# Patient Record
Sex: FEMALE | Race: ASIAN | Employment: FULL TIME | URBAN - METROPOLITAN AREA
[De-identification: names, ages, dates, MRNs, and addresses within clinical notes are randomized per-mention and may not be internally consistent; named-entity substitution may affect disease eponyms.]

---

## 2017-06-02 NOTE — PROGRESS NOTES
SUBJECTIVE:     CC: Trinity Ramírez is an 42 year old woman who presents for preventive health visit.     Healthy Habits:    Do you get at least three servings of calcium containing foods daily (dairy, green leafy vegetables, etc.)? yes    Amount of exercise or daily activities, outside of work: none    Problems taking medications regularly not applicable    Medication side effects: N/A    Have you had an eye exam in the past two years? yes    Do you see a dentist twice per year? no    Do you have sleep apnea, excessive snoring or daytime drowsiness?yes- snoring         -------------------------------------    Today's PHQ-2 Score:   PHQ-2 ( 1999 Pf[employer]) 6/6/2017 7/16/2014   Q1: Little interest or pleasure in doing things 0 0   Q2: Feeling down, depressed or hopeless 0 0   PHQ-2 Score 0 0       Abuse: Current or Past(Physical, Sexual or Emotional)- No  Do you feel safe in your environment - Yes    Social History   Substance Use Topics     Smoking status: Never Smoker     Smokeless tobacco: Not on file     Alcohol use No     The patient does not drink >3 drinks per day nor >7 drinks per week.    Recent Labs   Lab Test  11/11/14   1057   CHOL  145   HDL  41*   LDL  75   TRIG  145   CHOLHDLRATIO  3.5       Reviewed orders with patient.  Reviewed health maintenance and updated orders accordingly - Yes    Mammo Decision Support:  Patient under age 50, mutual decision reflected in health maintenance.      Pertinent mammograms are reviewed under the imaging tab.  History of abnormal Pap smear: Status post hysterectomy. Pap still indicated. Cervix in place    Reviewed and updated as needed this visit by clinical staff  Tobacco  Allergies  Meds  Med Hx  Surg Hx  Fam Hx  Soc Hx        Reviewed and updated as needed this visit by Provider            ROS:  This 42 year old female is here today for annual female exam. She had a supracervical hysterectomy in 2014 for menorrhagia. She needs a pap smear. She has 3 sons ages 16 -  23. She works as a . She speaks minimal English and is here with a male .   C: NEGATIVE for fever, chills, change in weight  I: NEGATIVE for worrisome rashes, moles or lesions  E: NEGATIVE for vision changes or irritation  ENT: NEGATIVE for ear, mouth and throat problems  R: NEGATIVE for significant cough or SOB  B: NEGATIVE for masses, tenderness or discharge  CV: NEGATIVE for chest pain, palpitations or peripheral edema  GI: NEGATIVE for nausea, abdominal pain, heartburn, or change in bowel habits but occasionally she will have pian in her left lower abdomen and over her suprapubic areas. Admits to occasional constipation.   : NEGATIVE for unusual urinary or vaginal symptoms. Periods are regular.  M: NEGATIVE for significant arthralgias or myalgia  N: NEGATIVE for weakness, dizziness or paresthesias  P: NEGATIVE for changes in mood or affect    Problem list, Medication list, Allergies, and Medical/Social/Surgical histories reviewed in [facility] and updated as appropriate.  Labs reviewed in EPIC  BP Readings from Last 3 Encounters:   06/06/17 122/70   01/05/15 114/75   11/19/14 127/76    Wt Readings from Last 3 Encounters:   06/06/17 145 lb (65.8 kg)   01/05/15 143 lb 4.8 oz (65 kg)   11/18/14 148 lb 8 oz (67.4 kg)                  Patient Active Problem List   Diagnosis     Anemia due to blood loss, acute     CARDIOVASCULAR SCREENING; LDL GOAL LESS THAN 160     S/p partial hysterectomy with remaining cervical stump     Past Surgical History:   Procedure Laterality Date     DILATION AND CURETTAGE  2014    for menorrhagia, hgb 4.6; enlarged uterus and thickened endometrium     HYSTERECTOMY SUPRACERVICAL N/A 11/17/2014    Procedure: HYSTERECTOMY SUPRACERVICAL;  Surgeon: Bere Marquez MD;  Location: [facility] OR     SALPINGECTOMY Bilateral 11/17/2014    Procedure: SALPINGECTOMY;  Surgeon: Bere Marquez MD;  Location: [facility] OR       Social History   Substance Use Topics     Smoking status: Never Smoker  "    Smokeless tobacco: Not on file     Alcohol use No     Family History   Problem Relation Age of Onset     Circulatory Father      Genitourinary Problems Father      Alcohol/Drug Paternal Uncle      DIABETES No family hx of      Breast Cancer No family hx of      Cancer - colorectal No family hx of          OBJECTIVE:     /70  Pulse 134  Temp 98.2  F (36.8  C) (Oral)  Ht 5' 1.81\" (1.57 m)  Wt 145 lb (65.8 kg)  LMP 10/28/2014 (Approximate)  SpO2 96%  BMI 26.68 kg/m2  EXAM:  GENERAL: healthy, alert and no distress  EYES: Eyes grossly normal to inspection, PERRL and conjunctivae and sclerae normal  HENT: ear canals and TM's normal, nose and mouth without ulcers or lesions  NECK: no adenopathy, no asymmetry, masses, or scars and thyroid normal to palpation  RESP: lungs clear to auscultation - no rales, rhonchi or wheezes  BREAST: normal without masses, tenderness or nipple discharge and no palpable axillary masses or adenopathy  CV: regular rate and rhythm, normal S1 S2, no S3 or S4, no murmur, click or rub, no peripheral edema and peripheral pulses strong  ABDOMEN: soft, nontender, no hepatosplenomegaly, no masses and bowel sounds normal   (female): normal female external genitalia, normal urethral meatus, vaginal mucosa pink, moist, well rugated, but cervix appears red and raw and inflamed. It bleeds easily. Pap done.   MS: no gross musculoskeletal defects noted, no edema  SKIN: no suspicious lesions or rashes  NEURO: Normal strength and tone, mentation intact and speech normal  PSYCH: mentation appears normal, affect normal/bright    ASSESSMENT/PLAN:     1. Encounter for routine adult health examination without abnormal findings  Healthy lady     2. Screening for malignant neoplasm of cervix  Due, but her recent vaginal bleeding is worrisome  - Pap imaged thin layer screen with HPV - recommended age 30 - 65 years (select HPV order below)  - HPV High Risk Types DNA Cervical    3. Constipation, " "unspecified constipation type  Discussed. Must try to eat more fiber and less carbohydrates and rice to avoid constipation. This should help her intermittent abdomen pain.     4. Cervicitis  Patient will call me if her bleeding persists. Then I will treat with antibiotics if her pap is normal. If her pap is abnormal, she will be referred to gynecologist     5. Screening for diabetes mellitus  due  - Glucose    6. CARDIOVASCULAR SCREENING; LDL GOAL LESS THAN 160  due  - Lipid panel reflex to direct LDL    COUNSELING:   Reviewed preventive health counseling, as reflected in patient instructions       Regular exercise       Healthy diet/nutrition         reports that she has never smoked. She does not have any smokeless tobacco history on file.    Estimated body mass index is 26.68 kg/(m^2) as calculated from the following:    Height as of this encounter: 5' 1.81\" (1.57 m).    Weight as of this encounter: 145 lb (65.8 kg).   Weight management plan: Discussed healthy diet and exercise guidelines and patient will follow up in 12 months in clinic to re-evaluate.    Counseling Resources:  ATP IV Guidelines  Pooled Cohorts Equation Calculator  Breast Cancer Risk Calculator  FRAX Risk Assessment  ICSI Preventive Guidelines  Dietary Guidelines for Americans, 2010  USDA's MyPlate  ASA Prophylaxis  Lung CA Screening    MARY CRAWFORD MD  [facility]  "

## 2017-06-02 NOTE — PATIENT INSTRUCTIONS
Preventive Health Recommendations  Female Ages 40 to 49    Yearly exam:     See your health care provider every year in order to  1. Review health changes.   2. Discuss preventive care.    3. Review your medicines if your doctor prescribed any.      Get a Pap test every three years (unless you have an abnormal result and your provider advises testing more often).      If you get Pap tests with HPV test, you only need to test every 5 years, unless you have an abnormal result. You do not need a Pap test if your uterus was removed (hysterectomy) and you have not had cancer.      You should be tested each year for STDs (sexually transmitted diseases), if you're at risk.       Ask your doctor if you should have a mammogram.      Have a colonoscopy (test for colon cancer) if someone in your family has had colon cancer or polyps before age 50.       Have a cholesterol test every 5 years.       Have a diabetes test (fasting glucose) after age 45. If you are at risk for diabetes, you should have this test every 3 years.    Shots: Get a flu shot each year. Get a tetanus shot every 10 years.     Nutrition:     Eat at least 5 servings of fruits and vegetables each day.    Eat whole-grain bread, whole-wheat pasta and brown rice instead of white grains and rice.    Talk to your provider about Calcium and Vitamin D.     Lifestyle    Exercise at least 150 minutes a week (an average of 30 minutes a day, 5 days a week). This will help you control your weight and prevent disease.    Limit alcohol to one drink per day.    No smoking.     Wear sunscreen to prevent skin cancer.    See your dentist every six months for an exam and cleaning.    [facility]    If you have any questions regarding to your visit please contact your care team:       Team Purple:   Clinic Hours Telephone Number   Dr. Mercy Olivera     7am-7pm  Monday - Thursday   7am-5pm  Fridays  [phone]  (Appointment  scheduling available 24/7)    Questions about your Visit?   Team Line:  [phone]   Urgent Care - [place] - 11am-9pm Monday-Friday Saturday-Sunday- 9am-5pm   [place] - 5pm-9pm Monday-Friday Saturday-Sunday- 9am-5pm  [phone] - [place]   [phone] - [place]       What options do I have for visits at the clinic other than the traditional office visit?  To expand how we care for you, many of our providers are utilizing electronic visits (e-visits) and telephone visits, when medically appropriate, for interactions with their patients rather than a visit in the clinic.   We also offer nurse visits for many medical concerns. Just like any other service, we will bill your insurance company for this type of visit based on time spent on the phone with your provider. Not all insurance companies cover these visits. Please check with your medical insurance if this type of visit is covered. You will be responsible for any charges that are not paid by your insurance.      E-visits via [employer]:  generally incur a $35.00 fee.  Telephone visits:  Time spent on the phone: *charged based on time that is spent on the phone in increments of 10 minutes. Estimated cost:   5-10 mins $30.00   11-20 mins. $59.00   21-30 mins. $85.00     Use Knovelhart (secure email communication and access to your chart) to send your primary care provider a message or make an appointment. Ask someone on your Team how to sign up for [employer].  For a Price Quote for your services, please call our Consumer Price Line at [phone].  As always, Thank you for trusting us with your health care needs!      Fern Barrett, [facility]

## 2017-06-06 ENCOUNTER — OFFICE VISIT (OUTPATIENT)
Dept: FAMILY MEDICINE | Facility: CLINIC | Age: 42
End: 2017-06-06
Payer: COMMERCIAL

## 2017-06-06 VITALS
SYSTOLIC BLOOD PRESSURE: 122 MMHG | TEMPERATURE: 98.2 F | HEIGHT: 62 IN | HEART RATE: 134 BPM | DIASTOLIC BLOOD PRESSURE: 70 MMHG | BODY MASS INDEX: 26.68 KG/M2 | WEIGHT: 145 LBS | OXYGEN SATURATION: 96 %

## 2017-06-06 DIAGNOSIS — Z13.6 CARDIOVASCULAR SCREENING; LDL GOAL LESS THAN 160: ICD-10-CM

## 2017-06-06 DIAGNOSIS — Z12.4 SCREENING FOR MALIGNANT NEOPLASM OF CERVIX: ICD-10-CM

## 2017-06-06 DIAGNOSIS — K59.00 CONSTIPATION, UNSPECIFIED CONSTIPATION TYPE: ICD-10-CM

## 2017-06-06 DIAGNOSIS — Z13.1 SCREENING FOR DIABETES MELLITUS: ICD-10-CM

## 2017-06-06 DIAGNOSIS — N72 CERVICITIS: ICD-10-CM

## 2017-06-06 DIAGNOSIS — Z00.00 ENCOUNTER FOR ROUTINE ADULT HEALTH EXAMINATION WITHOUT ABNORMAL FINDINGS: Primary | ICD-10-CM

## 2017-06-06 LAB
CHOLEST SERPL-MCNC: 141 MG/DL
GLUCOSE SERPL-MCNC: 144 MG/DL (ref 70–99)
HDLC SERPL-MCNC: 42 MG/DL
LDLC SERPL CALC-MCNC: 72 MG/DL
NONHDLC SERPL-MCNC: 99 MG/DL
TRIGL SERPL-MCNC: 135 MG/DL

## 2017-06-06 PROCEDURE — G0476 HPV COMBO ASSAY CA SCREEN: HCPCS | Performed by: FAMILY MEDICINE

## 2017-06-06 PROCEDURE — G0145 SCR C/V CYTO,THINLAYER,RESCR: HCPCS | Performed by: FAMILY MEDICINE

## 2017-06-06 PROCEDURE — 87624 HPV HI-RISK TYP POOLED RSLT: CPT | Performed by: FAMILY MEDICINE

## 2017-06-06 PROCEDURE — 99396 PREV VISIT EST AGE 40-64: CPT | Performed by: FAMILY MEDICINE

## 2017-06-06 PROCEDURE — 82947 ASSAY GLUCOSE BLOOD QUANT: CPT | Performed by: FAMILY MEDICINE

## 2017-06-06 PROCEDURE — 36415 COLL VENOUS BLD VENIPUNCTURE: CPT | Performed by: FAMILY MEDICINE

## 2017-06-06 PROCEDURE — 80061 LIPID PANEL: CPT | Performed by: FAMILY MEDICINE

## 2017-06-06 NOTE — MR AVS SNAPSHOT
After Visit Summary   6/6/2017    Trinity Ramírez    MRN: [id number]           Patient Information     Date Of Birth          1975        Visit Information        Provider Department      6/6/2017 7:45 AM Mercy Wayne MD; [employer] [facility] [facility]        Today's Diagnoses     Encounter for routine adult health examination without abnormal findings    -  1    Screening for malignant neoplasm of cervix        Screening for diabetes mellitus        CARDIOVASCULAR SCREENING; LDL GOAL LESS THAN 160          Care Instructions      Preventive Health Recommendations  Female Ages 40 to 49    Yearly exam:     See your health care provider every year in order to  1. Review health changes.   2. Discuss preventive care.    3. Review your medicines if your doctor prescribed any.      Get a Pap test every three years (unless you have an abnormal result and your provider advises testing more often).      If you get Pap tests with HPV test, you only need to test every 5 years, unless you have an abnormal result. You do not need a Pap test if your uterus was removed (hysterectomy) and you have not had cancer.      You should be tested each year for STDs (sexually transmitted diseases), if you're at risk.       Ask your doctor if you should have a mammogram.      Have a colonoscopy (test for colon cancer) if someone in your family has had colon cancer or polyps before age 50.       Have a cholesterol test every 5 years.       Have a diabetes test (fasting glucose) after age 45. If you are at risk for diabetes, you should have this test every 3 years.    Shots: Get a flu shot each year. Get a tetanus shot every 10 years.     Nutrition:     Eat at least 5 servings of fruits and vegetables each day.    Eat whole-grain bread, whole-wheat pasta and brown rice instead of white grains and rice.    Talk to your provider about Calcium and Vitamin D.     Lifestyle    Exercise at least 150  minutes a week (an average of 30 minutes a day, 5 days a week). This will help you control your weight and prevent disease.    Limit alcohol to one drink per day.    No smoking.     Wear sunscreen to prevent skin cancer.    See your dentist every six months for an exam and cleaning.    [facility]    If you have any questions regarding to your visit please contact your care team:       Team Purple:   Clinic Hours Telephone Number   Dr. Mercy Olivera     7am-7pm  Monday - Thursday   7am-5pm  Fridays  [phone]  (Appointment scheduling available 24/7)    Questions about your Visit?   Team Line:  [phone]   Urgent Care - [place] and [facility] - 11am-9pm Monday-Friday Saturday-Sunday- 9am-5pm   [place] - 5pm-9pm Monday-Friday Saturday-Sunday- 9am-5pm  [phone] - [place]   [phone] - [place]       What options do I have for visits at the clinic other than the traditional office visit?  To expand how we care for you, many of our providers are utilizing electronic visits (e-visits) and telephone visits, when medically appropriate, for interactions with their patients rather than a visit in the clinic.   We also offer nurse visits for many medical concerns. Just like any other service, we will bill your insurance company for this type of visit based on time spent on the phone with your provider. Not all insurance companies cover these visits. Please check with your medical insurance if this type of visit is covered. You will be responsible for any charges that are not paid by your insurance.      E-visits via eyeQ:  generally incur a $35.00 fee.  Telephone visits:  Time spent on the phone: *charged based on time that is spent on the phone in increments of 10 minutes. Estimated cost:   5-10 mins $30.00   11-20 mins. $59.00   21-30 mins. $85.00     Use eyeQ (secure email communication and access to your chart) to send your  "primary care provider a message or make an appointment. Ask someone on your Team how to sign up for [employer].  For a Price Quote for your services, please call our [employer] Price Line at [phone].  As always, Thank you for trusting us with your health care needs!              Follow-ups after your visit        Who to contact     If you have questions or need follow up information about today's clinic visit or your schedule please contact [facility] directly at [phone].  Normal or non-critical lab and imaging results will be communicated to you by [employer] Drivenhart, letter or phone within 4 business days after the clinic has received the results. If you do not hear from us within 7 days, please contact the clinic through [employer] or phone. If you have a critical or abnormal lab result, we will notify you by phone as soon as possible.  Submit refill requests through PixelFish or call your pharmacy and they will forward the refill request to us. Please allow 3 business days for your refill to be completed.          Additional Information About Your Visit        PixelFish Information     PixelFish lets you send messages to your doctor, view your test results, renew your prescriptions, schedule appointments and more. To sign up, go to [email or web address] . Click on \"Log in\" on the left side of the screen, which will take you to the Welcome page. Then click on \"Sign up Now\" on the right side of the page.     You will be asked to enter the access code listed below, as well as some personal information. Please follow the directions to create your username and password.     Your access code is: [email or web address]  Expires: 2017  8:45 AM     Your access code will  in 90 days. If you need help or a new code, please call your [place] clinic or [phone].        Care EveryWhere ID     This is your Care EveryWhere ID. This could be used by other organizations to access your [place] medical " "records  [id number]        Your Vitals Were     Pulse Temperature Height Last Period Pulse Oximetry BMI (Body Mass Index)    134 98.2  F (36.8  C) (Oral) 5' 1.81\" (1.57 m) 10/28/2014 (Approximate) 96% 26.68 kg/m2       Blood Pressure from Last 3 Encounters:   06/06/17 122/70   01/05/15 114/75   11/19/14 127/76    Weight from Last 3 Encounters:   06/06/17 145 lb (65.8 kg)   01/05/15 143 lb 4.8 oz (65 kg)   11/18/14 148 lb 8 oz (67.4 kg)              We Performed the Following     Glucose     HPV High Risk Types DNA Cervical     Lipid panel reflex to direct LDL     Pap imaged thin layer screen with HPV - recommended age 30 - 65 years (select HPV order below)        Primary Care Provider Office Phone # Fax #    STEVIE Sanchez [facility] [phone]       [facility] [address] [place] [id number]        Thank you!     Thank you for choosing [facility]  for your care. Our goal is always to provide you with excellent care. Hearing back from our patients is one way we can continue to improve our services. Please take a few minutes to complete the written survey that you may receive in the mail after your visit with us. Thank you!             Your Updated Medication List - Protect others around you: Learn how to safely use, store and throw away your medicines at www.disposemymeds.org.      Notice  As of 6/6/2017  8:45 AM    You have not been prescribed any medications.      "

## 2017-06-06 NOTE — LETTER
June 16, 2017    Trinity Rabago   [address] [place] [address]    Dear [facility],  We are happy to inform you that your PAP smear result from 6/6/17 is normal.  We are now able to do a follow up test on PAP smears. The DNA test is for HPV (Human Papilloma Virus). Cervical cancer is closely linked with certain types of HPV. Your result showed no evidence of high risk HPV.  Therefore we recommend you return in 3 years for your next pap smear and HPV test.  You will still need to return to the clinic every year for an annual exam and other preventive tests.  Please contact the clinic at [phone] with any questions.  Sincerely,    MARY CRAWFORD MD/shaniqua

## 2017-06-06 NOTE — LETTER
[address] [place] [address]    June 16, 2017    Gianna Benavides  [address] [place]  [facility] [address]          Dear Gianna,    Your pap smear was normal. However, I noted that your cervix was quite red and inflamed at the time of your visit with me. Enclosed is a prescription for an antibiotic for you to take twice a day for 10 days to treat that inflammation. I wish you well.     Results for orders placed or performed in visit on 06/06/17   Lipid panel reflex to direct LDL   Result Value Ref Range    Cholesterol 141 <200 mg/dL    Triglycerides 135 <150 mg/dL    HDL Cholesterol 42 (L) >49 mg/dL    LDL Cholesterol Calculated 72 <100 mg/dL    Non HDL Cholesterol 99 <130 mg/dL   Glucose   Result Value Ref Range    Glucose 144 (H) 70 - 99 mg/dL   Pap imaged thin layer screen with HPV - recommended age 30 - 65 years (select HPV order below)   Result Value Ref Range    PAP NIL     Copath Report         Patient Name: GIANNA BENAVIDES  MR#: [id number]  Specimen #: [id number]  Collected: 6/6/2017  Received: 6/7/2017  Reported: 6/8/2017 09:50  Ordering Phy(s): MARY CRAWFORD    For improved result formatting, select 'View Enhanced Report Format'  under Linked Documents section.    SPECIMEN/STAIN PROCESS:  Pap imaged thin layer prep screening (Surepath, FocalPoint with guided  screening)       Pap-Cyto x 1, HPV ordered x 1    SOURCE: Cervical, endocervical  ----------------------------------------------------------------   Pap imaged thin layer prep screening (Surepath, FocalPoint with guided  screening)  SPECIMEN ADEQUACY:  Satisfactory for evaluation.  -Transformation zone component present.    CYTOLOGIC INTERPRETATION:    Negative for intraepithelial lesion or malignancy    Electronically signed out by:  LISA Silva ( ASCP)    Processed and screened at [facility],  [facility]  [facility]    CLINICAL HISTORY:  LMP: 10/28/2014  Previous normal pap   Date of Last Pap: 2/19/2014,    Papanicolaou Test Limitations:  Cervical cytology is a screening test  with limited sensitivity; regular screening is critical for cancer  prevention; Pap tests are primarily effective for the  diagnosis/prevention of squamous cell carcinoma, not adenocarcinomas or  other cancers.    TESTING LAB LOCATION:  [address]  [phone]    COLLECTION SITE:  Client:  [facility]  Location: [facility] (B)     HPV High Risk Types DNA Cervical   Result Value Ref Range    HPV 16 DNA Negative NEG    HPV 18 DNA Negative NEG    Other HR HPV Negative NEG    Final Diagnosis       This patient's sample is negative for HPV DNA.  (Note)  METHODOLOGY:  The Roche marie 4800 system uses automated extraction,  simultaneous amplification of HPV (L1 region) and beta-globin,  followed by  real time detection of fluorescent labeled HPV and beta  globin using specific oligonucleotide probes . The test specifically  identifies types HPV 16 DNA and HPV 18 DNA while concurrently  detecting the rest of the high risk types (31, 33, 35, 39, 45, 51,  52, 56, 58, 59, 66 or 68).    COMMENTS:  This test is not intended for use as a screening device  for women under age 30 with normal cervical cytology.  Results should  be correlated with cytologic and histologic findings. Close clinical  followup is recommended.    This test was developed and its performance characteristics  determined by the [facility], [facility]  [facility]. It has not been cleared or approved by the  FDA. The laboratory is regulated under CLIA as qualified to perform  high- complexity testing. This test is used for clinical purposes. It  should not be regarded as investigational or for research.        Specimen Description Cervical Cells  [id number]  [id number]          If you have any questions or concerns, please me or my clinic team at [phone].      Sincerely,        Mercy Wayne MD/bt

## 2017-06-06 NOTE — LETTER
[facility]  [address] [place] [address]    June 7, 2017    Trinity Rabago Ho  [address] [address]  [place] [facility] [address]          Dear Trinity,  Your lab tests are looking good, but your glucose is too high. I worry that you may have developed diabetes. Please make another appointment so I can do more tests to see if you have diabetes.   Enclosed is a copy of your results.     Results for orders placed or performed in visit on 06/06/17   Lipid panel reflex to direct LDL   Result Value Ref Range    Cholesterol 141 <200 mg/dL    Triglycerides 135 <150 mg/dL    HDL Cholesterol 42 (L) >49 mg/dL    LDL Cholesterol Calculated 72 <100 mg/dL    Non HDL Cholesterol 99 <130 mg/dL   Glucose   Result Value Ref Range    Glucose 144 (H) 70 - 99 mg/dL       If you have any questions or concerns, please call myself or my nurse at [phone].      Sincerely,        Mercy Wayne MD/pb

## 2017-06-08 LAB
COPATH REPORT: NORMAL
PAP: NORMAL

## 2017-06-12 LAB
FINAL DIAGNOSIS: NORMAL
HPV HR 12 DNA CVX QL NAA+PROBE: NEGATIVE
HPV16 DNA SPEC QL NAA+PROBE: NEGATIVE
HPV18 DNA SPEC QL NAA+PROBE: NEGATIVE
SPECIMEN DESCRIPTION: NORMAL

## 2017-06-16 RX ORDER — DOXYCYCLINE HYCLATE 100 MG
100 TABLET ORAL 2 TIMES DAILY
Qty: 20 TABLET | Refills: 0 | Status: SHIPPED | OUTPATIENT
Start: 2017-06-16 | End: 2017-07-06

## 2017-06-21 ENCOUNTER — DOCUMENTATION ONLY (OUTPATIENT)
Dept: LAB | Facility: CLINIC | Age: 42
End: 2017-06-21

## 2017-06-21 NOTE — PROGRESS NOTES
SUBJECTIVE:                                                    Trinity Ramírez is a 42 year old female who presents to clinic today for the following health issues:      Vaginal bleeding has had a Hysterectomy           Problem list and histories reviewed & adjusted, as indicated.  Additional history: as documented    Patient Active Problem List   Diagnosis     Anemia due to blood loss, acute     CARDIOVASCULAR SCREENING; LDL GOAL LESS THAN 160     S/p partial hysterectomy with remaining cervical stump     Past Surgical History:   Procedure Laterality Date     DILATION AND CURETTAGE  2014    for menorrhagia, hgb 4.6; enlarged uterus and thickened endometrium     HYSTERECTOMY SUPRACERVICAL N/A 11/17/2014    Procedure: HYSTERECTOMY SUPRACERVICAL;  Surgeon: Bere Marquez MD;  Location: [facility]     SALPINGECTOMY Bilateral 11/17/2014    Procedure: SALPINGECTOMY;  Surgeon: Bere Marquez MD;  Location: [facility]       Social History   Substance Use Topics     Smoking status: Never Smoker     Smokeless tobacco: Not on file     Alcohol use No     Family History   Problem Relation Age of Onset     Circulatory Father      Genitourinary Problems Father      Alcohol/Drug Paternal Uncle      DIABETES No family hx of      Breast Cancer No family hx of      Cancer - colorectal No family hx of          Current Outpatient Prescriptions   Medication Sig Dispense Refill     doxycycline (VIBRA-TABS) 100 MG tablet Take 1 tablet (100 mg) by mouth 2 times daily 20 tablet 0     BP Readings from Last 3 Encounters:   06/26/17 118/64   06/06/17 122/70   01/05/15 114/75    Wt Readings from Last 3 Encounters:   06/26/17 141 lb 6.4 oz (64.1 kg)   06/06/17 145 lb (65.8 kg)   01/05/15 143 lb 4.8 oz (65 kg)                  Labs reviewed in EPIC    Reviewed and updated as needed this visit by clinical staff  Tobacco  Allergies  Meds  Med Hx  Surg Hx  Fam Hx  Soc Hx      Reviewed and updated as needed this visit by Provider         ROS:  This 42  "year old female is here today with an . Patient saw me 6/6/17 for annual female exam. She had supracervical hysterectomy in 2014 for menorrhagia. She told me she has occasional pain in her left lower abdomen and over her suprapubic area. I was surprised to see that her cervix was quite red, raw and inflamed when I did her pap smear. Pap smear came back normal so I chose to treat her with doxycycline 100 mg BID for 10 days. She has one day left and she is noticing that she has vaginal spotting whenever she has urination or a bowel movement. Also has vaginal spotting if she lifts a heavy object. This is all new to her. All other review of systems are negative  Personal, family, and social history reviewed with patient and revised.         OBJECTIVE:     /64 (BP Location: Right arm, Patient Position: Chair, Cuff Size: Adult Regular)  Pulse 79  Temp 98.2  F (36.8  C)  Ht 5' 1.75\" (1.568 m)  Wt 141 lb 6.4 oz (64.1 kg)  LMP 10/28/2014 (Approximate)  SpO2 98%  BMI 26.07 kg/m2  Body mass index is 26.07 kg/(m^2).   vaginal exam: patient has active bleeding in her vaginal area. Hard to see her cervix very clearly today. This vaginal bleeding is very unusual for post hysterectomy and a normal pap smear. She needs to be seen by GYN.  Bimanual exam revealed some left lower pelvic discomfort.     Diagnostic Test Results:  none     ASSESSMENT/PLAN:              1. Cervicitis  As above, this has not responded to doxycycline.   - OB/GYN REFERRAL    Return to clinic if no improvement     MARY CRAWFORD MD  [facility]    "

## 2017-06-21 NOTE — PROGRESS NOTES
Voice mail left for patient via [employer]  to call RN hotline at [phone].    Lakia Anderson RN

## 2017-06-21 NOTE — PROGRESS NOTES
Spoke with patient  and canceled lab only appointment.   verbalized understanding and will have patient come to clinic at scheduled time.   Laika Anderson RN

## 2017-06-21 NOTE — PROGRESS NOTES
RN: call patient. She doesn't need future labs. She just needs to make an appointment to see me so I can do hemoglobin A1C and a random glucose on that day of the visit.     MRAY CRAWFORD M.D.

## 2017-06-26 ENCOUNTER — OFFICE VISIT (OUTPATIENT)
Dept: FAMILY MEDICINE | Facility: CLINIC | Age: 42
End: 2017-06-26
Payer: COMMERCIAL

## 2017-06-26 VITALS
BODY MASS INDEX: 26.02 KG/M2 | HEART RATE: 79 BPM | TEMPERATURE: 98.2 F | WEIGHT: 141.4 LBS | OXYGEN SATURATION: 98 % | HEIGHT: 62 IN | SYSTOLIC BLOOD PRESSURE: 118 MMHG | DIASTOLIC BLOOD PRESSURE: 64 MMHG

## 2017-06-26 DIAGNOSIS — N72 CERVICITIS: Primary | ICD-10-CM

## 2017-06-26 PROCEDURE — 99213 OFFICE O/P EST LOW 20 MIN: CPT | Performed by: FAMILY MEDICINE

## 2017-06-26 NOTE — MR AVS SNAPSHOT
After Visit Summary   6/26/2017    Trinity Ramírez    MRN: [id number]           Patient Information     Date Of Birth          1975        Visit Information        Provider Department      6/26/2017 9:45 AM Mercy Wayne MD; [employer] [facility]        Today's Diagnoses     Cervicitis    -  1      Care Instructions    [facility]    If you have any questions regarding to your visit please contact your care team:       Team Purple:   Clinic Hours Telephone Number   Dr. Mercy Olivera     7am-7pm  Monday - Thursday   7am-5pm  Fridays  [phone]  (Appointment scheduling available 24/7)    Questions about your Visit?   Team Line:  [phone]   Urgent Care - [place] and [facility] - 11am-9pm Monday-Friday Saturday-Sunday- 9am-5pm   [place] - 5pm-9pm Monday-Friday Saturday-Sunday- 9am-5pm  [phone] - [facility]  [phone] - [place]       What options do I have for visits at the clinic other than the traditional office visit?  To expand how we care for you, many of our providers are utilizing electronic visits (e-visits) and telephone visits, when medically appropriate, for interactions with their patients rather than a visit in the clinic.   We also offer nurse visits for many medical concerns. Just like any other service, we will bill your insurance company for this type of visit based on time spent on the phone with your provider. Not all insurance companies cover these visits. Please check with your medical insurance if this type of visit is covered. You will be responsible for any charges that are not paid by your insurance.      E-visits via Creative[employer]:  generally incur a $35.00 fee.  Telephone visits:  Time spent on the phone: *charged based on time that is spent on the phone in increments of 10 minutes. Estimated cost:   5-10 mins $30.00   11-20 mins. $59.00   21-30 mins. $85.00      Use [employer] (secure email communication and access to your chart) to send your primary care provider a message or make an appointment. Ask someone on your Team how to sign up for AppFirst.  For a Price Quote for your services, please call our Consumer Price Line at [phone].  As always, Thank you for trusting us with your health care needs!              Follow-ups after your visit        Additional Services     OB/GYN REFERRAL       Your provider has referred you to:  FM[facility]: [facility] [phone]   [email or web address]     Please be aware that coverage of these services is subject to the terms and limitations of your health insurance plan.  Call member services at your health plan with any benefit or coverage questions.      Please bring the following to your appointment:  >>   Any x-rays, CTs or MRIs which have been performed.  Contact the facility where they were done to arrange for  prior to your scheduled appointment.  Any new CT, MRI or other procedures ordered by your specialist must be performed at a [facility] facility or coordinated by your clinic's referral office.    >>   List of current medications   >>   This referral request   >>   Any documents/labs given to you for this referral                  Who to contact     If you have questions or need follow up information about today's clinic visit or your schedule please contact [facility] directly at [phone].  Normal or non-critical lab and imaging results will be communicated to you by MyChart, letter or phone within 4 business days after the clinic has received the results. If you do not hear from us within 7 days, please contact the clinic through Iptiviahart or phone. If you have a critical or abnormal lab result, we will notify you by phone as soon as possible.  Submit refill requests through AppFirst or call your pharmacy and they will forward the refill  "request to us. Please allow 3 business days for your refill to be completed.          Additional Information About Your Visit        MyChart Information     [employer] lets you send messages to your doctor, view your test results, renew your prescriptions, schedule appointments and more. To sign up, go to [email or web address] . Click on \"Log in\" on the left side of the screen, which will take you to the Welcome page. Then click on \"Sign up Now\" on the right side of the page.     You will be asked to enter the access code listed below, as well as some personal information. Please follow the directions to create your username and password.     Your access code is: [email or web address]  Expires: 2017  8:45 AM     Your access code will  in 90 days. If you need help or a new code, please call your [facility] clinic or [phone].        Care EveryWhere ID     This is your [facility] EveryWhere ID. This could be used by other organizations to access your [facility] medical records  [id number]        Your Vitals Were     Pulse Temperature Height Last Period Pulse Oximetry BMI (Body Mass Index)    79 98.2  F (36.8  C) 5' 1.75\" (1.568 m) 10/28/2014 (Approximate) 98% 26.07 kg/m2       Blood Pressure from Last 3 Encounters:   17 118/64   17 122/70   01/05/15 114/75    Weight from Last 3 Encounters:   17 141 lb 6.4 oz (64.1 kg)   17 145 lb (65.8 kg)   01/05/15 143 lb 4.8 oz (65 kg)              We Performed the Following     OB/GYN REFERRAL        Primary Care Provider Office Phone # Fax #    STEVIE Sanchez [phone]       [facility] [address] [address]        Equal Access to Services     CESAR TERRAZAS : Fabio Severino, mehreen lawler, laci kellogg . [facility] [phone].    ATENCIÓN: Si habla español, tiene a faria disposición servicios gratuitos de asistencia lingüística. Llame al " [phone].    We comply with applicable federal civil rights laws and [place] laws. We do not discriminate on the basis of race, color, national origin, age, disability sex, sexual orientation or gender identity.            Thank you!     Thank you for choosing [facility]  for your care. Our goal is always to provide you with excellent care. Hearing back from our patients is one way we can continue to improve our services. Please take a few minutes to complete the written survey that you may receive in the mail after your visit with us. Thank you!             Your Updated Medication List - Protect others around you: Learn how to safely use, store and throw away your medicines at www.disposemymeds.org.          This list is accurate as of: 6/26/17 10:21 AM.  Always use your most recent med list.                   Brand Name Dispense Instructions for use Diagnosis    doxycycline 100 MG tablet    VIBRA-TABS    20 tablet    Take 1 tablet (100 mg) by mouth 2 times daily    Cervicitis

## 2017-06-26 NOTE — PATIENT INSTRUCTIONS
[facility]    If you have any questions regarding to your visit please contact your care team:       Team Purple:   Clinic Hours Telephone Number   Dr. Mercy Olivera     7am-7pm  Monday - Thursday   7am-5pm  Fridays  [phone]  (Appointment scheduling available 24/7)    Questions about your Visit?   Team Line:  [phone]   Urgent Care - [place] and [facility] - 11am-9pm Monday-Friday Saturday-Sunday- 9am-5pm   [place] - 5pm-9pm Monday-Friday Saturday-Sunday- 9am-5pm  [phone] - [employer]  [phone] - [place]       What options do I have for visits at the clinic other than the traditional office visit?  To expand how we care for you, many of our providers are utilizing electronic visits (e-visits) and telephone visits, when medically appropriate, for interactions with their patients rather than a visit in the clinic.   We also offer nurse visits for many medical concerns. Just like any other service, we will bill your insurance company for this type of visit based on time spent on the phone with your provider. Not all insurance companies cover these visits. Please check with your medical insurance if this type of visit is covered. You will be responsible for any charges that are not paid by your insurance.      E-visits via [employer]:  generally incur a $35.00 fee.  Telephone visits:  Time spent on the phone: *charged based on time that is spent on the phone in increments of 10 minutes. Estimated cost:   5-10 mins $30.00   11-20 mins. $59.00   21-30 mins. $85.00     Use [employer] (secure email communication and access to your chart) to send your primary care provider a message or make an appointment. Ask someone on your Team how to sign up for [employer].  For a Price Quote for your services, please call our Consumer Price Line at [phone].  As always, Thank you for trusting us with your health care needs!

## 2017-06-27 ENCOUNTER — OFFICE VISIT (OUTPATIENT)
Dept: OBGYN | Facility: CLINIC | Age: 42
End: 2017-06-27
Attending: FAMILY MEDICINE
Payer: COMMERCIAL

## 2017-06-27 VITALS
HEART RATE: 66 BPM | DIASTOLIC BLOOD PRESSURE: 73 MMHG | TEMPERATURE: 99.2 F | BODY MASS INDEX: 26.18 KG/M2 | WEIGHT: 142 LBS | SYSTOLIC BLOOD PRESSURE: 112 MMHG

## 2017-06-27 DIAGNOSIS — Z90.711 S/P PARTIAL HYSTERECTOMY WITH REMAINING CERVICAL STUMP: ICD-10-CM

## 2017-06-27 DIAGNOSIS — N89.8 VAGINAL DISCHARGE: ICD-10-CM

## 2017-06-27 DIAGNOSIS — N93.9 VAGINAL BLEEDING: Primary | ICD-10-CM

## 2017-06-27 LAB
MICRO REPORT STATUS: ABNORMAL
SPECIMEN SOURCE: ABNORMAL
WET PREP SPEC: ABNORMAL

## 2017-06-27 PROCEDURE — 99214 OFFICE O/P EST MOD 30 MIN: CPT | Performed by: OBSTETRICS & GYNECOLOGY

## 2017-06-27 PROCEDURE — 87491 CHLMYD TRACH DNA AMP PROBE: CPT | Performed by: OBSTETRICS & GYNECOLOGY

## 2017-06-27 PROCEDURE — 87210 SMEAR WET MOUNT SALINE/INK: CPT | Performed by: OBSTETRICS & GYNECOLOGY

## 2017-06-27 PROCEDURE — 87591 N.GONORRHOEAE DNA AMP PROB: CPT | Performed by: OBSTETRICS & GYNECOLOGY

## 2017-06-27 RX ORDER — FLUCONAZOLE 150 MG/1
150 TABLET ORAL ONCE
Qty: 1 TABLET | Refills: 0 | Status: SHIPPED | OUTPATIENT
Start: 2017-06-27 | End: 2017-06-27

## 2017-06-27 NOTE — MR AVS SNAPSHOT
After Visit Summary   6/27/2017    Trinity Ramírez    MRN: [id number]           Patient Information     Date Of Birth          1975        Visit Information        Provider Department      6/27/2017 9:30 AM Agbeh, Cephas Mawuena, MD; [employer] [facility]        Today's Diagnoses     Vaginal bleeding    -  1    Vaginal discharge          Care Instructions                                                        If you have any questions regarding your visit, Please contact your care team.      Women s Health CLINIC HOURS TELEPHONE NUMBER   Cephas Agbeh, M.D. Lisa -      [place]     [facility]  8:00a.m-4:45 p.m  Tuesday--[place]   8:00a.m-4:45 p.m.  [facility]  8:00a.m-4:45 p.m.  Friday-[facility]  [address] [place] [address]  [phone]    [id number]-[phone]    [place] [id number] [facility] [address]  [phone]  Inbntvk-[phone]   Urgent Care locations:    [facility] Monday-Friday  5 pm - 9 pm  Saturday and Sunday   9 am - 5 pm    Monday-Friday   11 pm - 9 pm  Saturday and Sunday   9 am - 5 pm   [phone]     If you need a medication refill, please contact your pharmacy. Please allow 3 business days for your refill to be completed.  As always, Thank you for trusting us with your healthcare needs!              Follow-ups after your visit        Your next 10 appointments already scheduled     Jul 06, 2017  2:15 PM CDT   Office Visit with Cephas Mawuena Agbeh, MD   [facility]    [address] [facility] [id number] [phone]           Bring a current list of meds and any records pertaining to this visit.  For Physicals, please bring immunization records and any forms needing to be filled out.  Please arrive 10 minutes early to complete paperwork.              Who to  "contact     If you have questions or need follow up information about today's clinic visit or your schedule please contact [facility] directly at [phone].  Normal or non-critical lab and imaging results will be communicated to you by MyChart, letter or phone within 4 business days after the clinic has received the results. If you do not hear from us within 7 days, please contact the clinic through MyChart or phone. If you have a critical or abnormal lab result, we will notify you by phone as soon as possible.  Submit refill requests through [employer] or call your pharmacy and they will forward the refill request to us. Please allow 3 business days for your refill to be completed.          Additional Information About Your Visit        [facility] Information     [employer] lets you send messages to your doctor, view your test results, renew your prescriptions, schedule appointments and more. To sign up, go to [email or web address] . Click on \"Log in\" on the left side of the screen, which will take you to the Welcome page. Then click on \"Sign up Now\" on the right side of the page.     You will be asked to enter the access code listed below, as well as some personal information. Please follow the directions to create your username and password.     Your access code is: [email or web address]  Expires: 2017  8:45 AM     Your access code will  in 90 days. If you need help or a new code, please call your [place] clinic or [phone].        Care EveryWhere ID     This is your Care EveryWhere ID. This could be used by other organizations to access your [place] medical records  [id number]        Your Vitals Were     Pulse Temperature Last Period Breastfeeding? BMI (Body Mass Index)       66 99.2  F (37.3  C) (Oral) 10/28/2014 (Approximate) No 26.18 kg/m2        Blood Pressure from Last 3 Encounters:   17 112/73   17 118/64   17 122/70    Weight from Last 3 Encounters:   17 64.4 " kg (142 lb)   06/26/17 64.1 kg (141 lb 6.4 oz)   06/06/17 65.8 kg (145 lb)              We Performed the Following     Chlamydia trachomatis PCR     Neisseria gonorrhoeae PCR     Wet prep          Today's Medication Changes          These changes are accurate as of: 6/27/17 10:31 AM.  If you have any questions, ask your nurse or doctor.               Start taking these medicines.        Dose/Directions    fluconazole 150 MG tablet   Commonly known as:  DIFLUCAN   Used for:  Vaginal discharge, Vaginal bleeding   Started by:  Agbeh, Cephas Mawuena, MD        Dose:  150 mg   Take 1 tablet (150 mg) by mouth once for 1 dose   Quantity:  1 tablet   Refills:  0            Where to get your medicines      These medications were sent to [id number] - [place], [place] - [address] [facility] [address], [facility] [address]     Phone:  [phone]     fluconazole 150 MG tablet                Primary Care Provider Office Phone # Fax #    Aspen Mendenhall, APRN [facility] [phone]       [facility] [address] [facility] [address]  [facility] [address]        Equal Access to Services     PHYLLIS TERRAZAS AH: Hadii luz marina ku hadasho Soomaali, waaxda luqadaha, qaybta kaalmada adeegyada, laci idiin hayaan kin lindsey . So [facility] [phone].    ATENCIÓN: Si habla español, tiene a faria disposición servicios gratuitos de asistencia lingüística. Llame al [phone].    We comply with applicable federal civil rights laws and [place] laws. We do not discriminate on the basis of race, color, national origin, age, disability sex, sexual orientation or gender identity.            Thank you!     Thank you for choosing [facility]  for your care. Our goal is always to provide you with excellent care. Hearing back from our patients is one way we can continue to improve our services. Please take a few minutes to complete the written survey that you may receive in the  mail after your visit with us. Thank you!             Your Updated Medication List - Protect others around you: Learn how to safely use, store and throw away your medicines at www.disposemymeds.org.          This list is accurate as of: 6/27/17 10:31 AM.  Always use your most recent med list.                   Brand Name Dispense Instructions for use Diagnosis    doxycycline 100 MG tablet    VIBRA-TABS    20 tablet    Take 1 tablet (100 mg) by mouth 2 times daily    Cervicitis       fluconazole 150 MG tablet    DIFLUCAN    1 tablet    Take 1 tablet (150 mg) by mouth once for 1 dose    Vaginal discharge, Vaginal bleeding

## 2017-06-27 NOTE — PATIENT INSTRUCTIONS
If you have any questions regarding your visit, Please contact your care team.      Women s Health CLINIC HOURS TELEPHONE NUMBER   Cephas Agbeh, M.D. Lisa -      [place]     Monday-[place]  8:00a.m-4:45 p.m  Tuesday--[place]   8:00a.m-4:45 p.m.  Thursday-[place]  8:00a.m-4:45 p.m.  Friday-[facility]  [address] [place] [address]  [phone]    [phone]    [facility]  [address] [facility] [address]  [phone]  Kotgijw-[phone]   Urgent Care locations:    [facility] Monday-Friday  5 pm - 9 pm  Saturday and Sunday   9 am - 5 pm    Monday-Friday   11 pm - 9 pm  Saturday and Sunday   9 am - 5 pm   [phone]     If you need a medication refill, please contact your pharmacy. Please allow 3 business days for your refill to be completed.  As always, Thank you for trusting us with your healthcare needs!

## 2017-06-27 NOTE — NURSING NOTE
"Chief Complaint   Patient presents with     Consult     Ref for cervicitis-Had hysterectomy in 2014       Initial /73 (BP Location: Right arm, Patient Position: Chair, Cuff Size: Adult Regular)  Pulse 66  Temp 99.2  F (37.3  C) (Oral)  Wt 64.4 kg (142 lb)  LMP 10/28/2014 (Approximate)  Breastfeeding? No  BMI 26.18 kg/m2 Estimated body mass index is 26.18 kg/(m^2) as calculated from the following:    Height as of 6/26/17: 1.568 m (5' 1.75\").    Weight as of this encounter: 64.4 kg (142 lb).  Medication Reconciliation: complete   Jamaal Crespo CMA      "

## 2017-06-27 NOTE — PROGRESS NOTES
Trinity is a 42 year old  referred here by Dr. CRAWFORD for consultation regarding spotting x 2 months on and off. This is worse with heavy lifting and hard bowel movement.Francisco pain, vaginal odor or itching  Her recent pap is normal. She is S/P Supracervical hysterectomy in . Here with interperater.    ROS: Ten point review of systems was reviewed and negative except the above.    Gyne: - abn pap (last pap ), - STD's    Past Medical History:   Diagnosis Date     DUB (dysfunctional uterine bleeding)      Varicella without mention of complication     Chickenpox     Past Surgical History:   Procedure Laterality Date     DILATION AND CURETTAGE      for menorrhagia, hgb 4.6; enlarged uterus and thickened endometrium     HYSTERECTOMY SUPRACERVICAL N/A 2014    Procedure: HYSTERECTOMY SUPRACERVICAL;  Surgeon: Bere Marquez MD;  [facility]     SALPINGECTOMY Bilateral 2014    Procedure: SALPINGECTOMY;  Surgeon: Bere Marquez MD;  [facility]     Patient Active Problem List   Diagnosis     Anemia due to blood loss, acute     CARDIOVASCULAR SCREENING; LDL GOAL LESS THAN 160     S/p partial hysterectomy with remaining cervical stump       ALL/Meds: Her medication and allergy histories were reviewed and are documented in their appropriate chart areas.    SH: - tob, - EtOH,     FH: Her family history was reviewed and documented in its appropriate chart area.    PE: /73 (BP Location: Right arm, Patient Position: Chair, Cuff Size: Adult Regular)  Pulse 66  Temp 99.2  F (37.3  C) (Oral)  Wt 64.4 kg (142 lb)  LMP 10/28/2014 (Approximate)  Breastfeeding? No  BMI 26.18 kg/m2  Body mass index is 26.18 kg/(m^2).    General:  WNWD female, NAD  Alert  Oriented x 3  Gait:  Normal  Skin:  Normal skin turgor  HEENT:  NC/AT, EOMI  Abdomen:  Non-tender, non-distended.  Vulva: No external lesions, normal hair distribution, no adenopathy  BUS:  Normal, no masses noted  Urethra:  No  hypermobility seen  Urethral meatus:  No masses noted  Vagina: Moist, pink, minimal brownish discharge, well rugated, no lesions  Cervix:  Smooth, pink, no visible lesions. Some bleeding from ecto/endocervix.  Perianal:  No masses noted  Rectal exam: No mass, non-tender, normal sphincter tone   Extremities:  No clubbing, no cyanosis and no edema.  Results for orders placed or performed in visit on 06/27/17   Wet prep   Result Value Ref Range    Specimen Description Vagina     Wet Prep (A)      Yeast seen  No Trichomonas seen  No clue cells seen      Micro Report Status FINAL 06/27/2017        A/P    ICD-10-CM    1. Vaginal bleeding N93.9 Wet prep     Neisseria gonorrhoeae PCR     Chlamydia trachomatis PCR     fluconazole (DIFLUCAN) 150 MG tablet   2. Vaginal discharge N89.8 Wet prep     Neisseria gonorrhoeae PCR     Chlamydia trachomatis PCR     fluconazole (DIFLUCAN) 150 MG tablet   3. S/p partial hysterectomy with remaining cervical stump Z90.711 Wet prep     Neisseria gonorrhoeae PCR     Chlamydia trachomatis PCR     fluconazole (DIFLUCAN) 150 MG tablet     Will treat yeast. Await STD cultures.  We also discussed endocervical bleeding from cervical stump.  The endocervix was cored of her surgery per operative notes.  If bleeding is not resolved by conservative means, consider cryo of endocervix or trachelectomy.  return to clinic in 1 week.    25 minutes was spent face to face with the patient today discussing her history, diagnosis, and follow-up plan as noted above.  Over 50% of the visit was spent in counseling and coordination of care.    Total Visit Time: 30 minutes.      CEPHAS AGBEH, MD.

## 2017-06-28 LAB
C TRACH DNA SPEC QL NAA+PROBE: NORMAL
N GONORRHOEA DNA SPEC QL NAA+PROBE: NORMAL
SPECIMEN SOURCE: NORMAL
SPECIMEN SOURCE: NORMAL

## 2017-07-06 ENCOUNTER — OFFICE VISIT (OUTPATIENT)
Dept: OBGYN | Facility: CLINIC | Age: 42
End: 2017-07-06
Payer: COMMERCIAL

## 2017-07-06 VITALS
SYSTOLIC BLOOD PRESSURE: 107 MMHG | DIASTOLIC BLOOD PRESSURE: 74 MMHG | HEART RATE: 89 BPM | TEMPERATURE: 98.2 F | OXYGEN SATURATION: 98 %

## 2017-07-06 DIAGNOSIS — Z90.711 S/P PARTIAL HYSTERECTOMY WITH REMAINING CERVICAL STUMP: Primary | ICD-10-CM

## 2017-07-06 PROCEDURE — 99214 OFFICE O/P EST MOD 30 MIN: CPT | Performed by: OBSTETRICS & GYNECOLOGY

## 2017-07-06 NOTE — NURSING NOTE
"Chief Complaint   Patient presents with     Gyn Exam     follow up bleeding       Initial /74 (BP Location: Left arm, Patient Position: Sitting, Cuff Size: Adult Regular)  Pulse 89  Temp 98.2  F (36.8  C) (Tympanic)  LMP 10/28/2014 (Approximate)  SpO2 98% Estimated body mass index is 26.18 kg/(m^2) as calculated from the following:    Height as of 6/26/17: 5' 1.75\" (1.568 m).    Weight as of 6/27/17: 142 lb (64.4 kg).  Medication Reconciliation: complete   "

## 2017-07-06 NOTE — MR AVS SNAPSHOT
"              After Visit Summary   2017    Trinity Ramírez    MRN: [id number]           Patient Information     Date Of Birth          1975        Visit Information        Provider Department      2017 2:00 PM Agbeh, Cephas Mawuena, MD; [employer] [facility]        Today's Diagnoses     S/p partial hysterectomy with remaining cervical stump    -  1       Follow-ups after your visit        Who to contact     If you have questions or need follow up information about today's clinic visit or your schedule please contact [facility] directly at [phone].  Normal or non-critical lab and imaging results will be communicated to you by [employer], letter or phone within 4 business days after the clinic has received the results. If you do not hear from us within 7 days, please contact the clinic through [employer] or phone. If you have a critical or abnormal lab result, we will notify you by phone as soon as possible.  Submit refill requests through [employer] or call your pharmacy and they will forward the refill request to us. Please allow 3 business days for your refill to be completed.          Additional Information About Your Visit        MyChart Information     [employer] lets you send messages to your doctor, view your test results, renew your prescriptions, schedule appointments and more. To sign up, go to [email or web address] . Click on \"Log in\" on the left side of the screen, which will take you to the Welcome page. Then click on \"Sign up Now\" on the right side of the page.     You will be asked to enter the access code listed below, as well as some personal information. Please follow the directions to create your username and password.     Your access code is: [email or web address]  Expires: 2017  8:45 AM     Your access code will  in 90 days. If you need help or a new code, please call your [facility] or [phone].        Care EveryWhere ID     This is your " Care EveryWhere ID. This could be used by other organizations to access your [place] medical records  [id number]        Your Vitals Were     Pulse Temperature Last Period Pulse Oximetry          89 98.2  F (36.8  C) (Tympanic) 10/28/2014 (Approximate) 98%         Blood Pressure from Last 3 Encounters:   07/06/17 107/74   06/27/17 112/73   06/26/17 118/64    Weight from Last 3 Encounters:   06/27/17 142 lb (64.4 kg)   06/26/17 141 lb 6.4 oz (64.1 kg)   06/06/17 145 lb (65.8 kg)              Today, you had the following     No orders found for display         Today's Medication Changes          These changes are accurate as of: 7/6/17  4:12 PM.  If you have any questions, ask your nurse or doctor.               Stop taking these medicines if you haven't already. Please contact your care team if you have questions.     doxycycline 100 MG tablet   Commonly known as:  VIBRA-TABS   Stopped by:  Agbeh, Cephas Mawuena, MD                    Primary Care Provider Office Phone # Fax #    Aspen GUY Abdirashid, APRN [facility] [phone]       [facility] [address]  [facility] [address]        Equal Access to Services     PHYLLIS TERRAZAS AH: Hadii luz marina ku hadasho Soomaali, waaxda luqadaha, qaybta kaalmada adeegyada, waxay saeid lindsey . So [facility] [phone].    ATENCIÓN: Si habla español, tiene a faria disposición servicios gratuitos de asistencia lingüística. Llyulisa al [phone].    We comply with applicable federal civil rights laws and [place] laws. We do not discriminate on the basis of race, color, national origin, age, disability sex, sexual orientation or gender identity.            Thank you!     Thank you for choosing [facility]  for your care. Our goal is always to provide you with excellent care. Hearing back from our patients is one way we can continue to improve our services. Please take a few minutes to complete the written survey that you may receive in the mail  after your visit with us. Thank you!             Your Updated Medication List - Protect others around you: Learn how to safely use, store and throw away your medicines at www.disposemymeds.org.      Notice  As of 7/6/2017  4:12 PM    You have not been prescribed any medications.

## 2017-07-06 NOTE — PROGRESS NOTES
Trinity is a 42 year old  here for follow up of irreguler spotting..  No more bleeding since she took the diflucan.  ROS: Ten point review of systems was reviewed and negative except the above.    Gyne: - abn pap (last pap ), - STD's    PMH: Her past medical, surgical, and obstetric histories were reviewed and are documented in their appropriate chart areas.    ALL/Meds: Her medication and allergy histories were reviewed and are documented in their appropriate chart areas.    SH: - tob, - EtOH,     PE: /74 (BP Location: Left arm, Patient Position: Sitting, Cuff Size: Adult Regular)  Pulse 89  Temp 98.2  F (36.8  C) (Tympanic)  LMP 10/28/2014 (Approximate)  SpO2 98%      General:  WNWD female, NAD  Alert  Oriented x 3  Gait:  Normal  Skin:  Normal skin turgor  HEENT:  NC/AT, EOMI  Abdomen:  Non-tender, non-distended.  Pelvic exam:  Not performed  Extremities:  No clubbing, no cyanosis and no edema.  Results for orders placed or performed in visit on 17   Wet prep   Result Value Ref Range    Specimen Description Vagina     Wet Prep (A)      Yeast seen  No Trichomonas seen  No clue cells seen      Micro Report Status FINAL 2017    Neisseria gonorrhoeae PCR   Result Value Ref Range    Specimen Descrip Cervical     N Gonorrhea PCR  NEG     Negative   Negative for N. gonorrhoeae rRNA by transcription mediated amplification.   A negative result by transcription mediated amplification does not preclude the   presence of N. gonorrhoeae infection because results are dependent on proper   and adequate collection, absence of inhibitors, and sufficient rRNA to be   detected.     Chlamydia trachomatis PCR   Result Value Ref Range    Specimen Description Cervical     Chlamydia Trachomatis PCR  NEG     Negative   Negative for C. trachomatis rRNA by transcription mediated amplification.   A negative result by transcription mediated amplification does not preclude the   presence of C. trachomatis  infection because results are dependent on proper   and adequate collection, absence of inhibitors, and sufficient rRNA to be   detected.         A/P     ICD-10-CM    1. S/p partial hysterectomy with remaining cervical stump Z90.711      Reassured by the other normal test result.   Discussed no further treatment at this time.    Return to clinic if any more bleeding.    25 minutes was spent face to face with the patient today discussing her history, diagnosis, and follow-up plan as noted above.  Over 50% of the visit was spent in counseling and coordination of care.    Total Visit Time: 30 minutes.    CEPHAS AGBEH, MD.

## 2017-09-15 ENCOUNTER — OFFICE VISIT (OUTPATIENT)
Dept: FAMILY MEDICINE | Facility: CLINIC | Age: 42
End: 2017-09-15
Payer: COMMERCIAL

## 2017-09-15 VITALS
WEIGHT: 142 LBS | OXYGEN SATURATION: 99 % | HEIGHT: 62 IN | BODY MASS INDEX: 26.13 KG/M2 | TEMPERATURE: 98.3 F | DIASTOLIC BLOOD PRESSURE: 88 MMHG | SYSTOLIC BLOOD PRESSURE: 116 MMHG | HEART RATE: 85 BPM

## 2017-09-15 DIAGNOSIS — R05.9 COUGH: ICD-10-CM

## 2017-09-15 DIAGNOSIS — J06.9 UPPER RESPIRATORY TRACT INFECTION, UNSPECIFIED TYPE: Primary | ICD-10-CM

## 2017-09-15 PROCEDURE — 99213 OFFICE O/P EST LOW 20 MIN: CPT | Performed by: FAMILY MEDICINE

## 2017-09-15 RX ORDER — BENZONATATE 200 MG/1
200 CAPSULE ORAL 3 TIMES DAILY PRN
Qty: 21 CAPSULE | Refills: 0 | Status: SHIPPED | OUTPATIENT
Start: 2017-09-15

## 2017-09-15 RX ORDER — AZITHROMYCIN 250 MG/1
TABLET, FILM COATED ORAL
Qty: 6 TABLET | Refills: 0 | Status: SHIPPED | OUTPATIENT
Start: 2017-09-15

## 2017-09-15 RX ORDER — FLUTICASONE PROPIONATE 50 MCG
2 SPRAY, SUSPENSION (ML) NASAL DAILY
Qty: 1 BOTTLE | Refills: 1 | Status: SHIPPED | OUTPATIENT
Start: 2017-09-15

## 2017-09-15 NOTE — PROGRESS NOTES
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  Additional history: as documented    Patient Active Problem List   Diagnosis     Anemia due to blood loss, acute     CARDIOVASCULAR SCREENING; LDL GOAL LESS THAN 160     S/p partial hysterectomy with remaining cervical stump     Past Surgical History:   Procedure Laterality Date     DILATION AND CURETTAGE  2014    for menorrhagia, hgb 4.6; enlarged uterus and thickened endometrium     HYSTERECTOMY SUPRACERVICAL N/A 11/17/2014    Procedure: HYSTERECTOMY SUPRACERVICAL;  Surgeon: Bere Marquez MD;  [facility]     SALPINGECTOMY Bilateral 11/17/2014    Procedure: SALPINGECTOMY;  Surgeon: Bere Marquez MD;  [facility]       Social History   Substance Use Topics     Smoking status: Never Smoker     Smokeless tobacco: Never Used     Alcohol use No     Family History   Problem Relation Age of Onset     Circulatory Father      Genitourinary Problems Father      Alcohol/Drug Paternal Uncle      DIABETES No family hx of      Breast Cancer No family hx of      Cancer - colorectal No family hx of          No current outpatient prescriptions on file.     Allergies   Allergen Reactions     No Known Drug Allergies      Recent Labs   Lab Test  06/06/17   0850  01/05/15   1248  11/11/14   1057  02/19/14   1728  " 02/19/14   1317   02/19/14   1154   LDL  72   --   75   --    --    --    --    HDL  42*   --   41*   --    --    --    --    TRIG  135   --   145   --    --    --    --    ALT   --    --    --    --   34   --    --    CR   --    --   0.58   --   0.60   --    --    GFRESTIMATED   --    --   >90  Non  GFR Calc     --   >90   --    --    GFRESTBLACK   --    --   >90   GFR Calc     --   >90   --    --    POTASSIUM   --    --   4.5  3.8  3.7   < >   --    TSH   --   2.58   --    --    --    --   2.13    < > = values in this interval not displayed.      BP Readings from Last 3 Encounters:   09/15/17 116/88   07/06/17 107/74   06/27/17 112/73    Wt Readings from Last 3 Encounters:   09/15/17 142 lb (64.4 kg)   06/27/17 142 lb (64.4 kg)   06/26/17 141 lb 6.4 oz (64.1 kg)                  Labs reviewed in EPIC          Reviewed and updated as needed this visit by clinical staffTobacco  Allergies  Meds  Med Hx  Surg Hx  Fam Hx  Soc Hx      Reviewed and updated as needed this visit by Provider         ROS:  C: NEGATIVE for fever, chills, change in weight  INTEGUMENTARY/SKIN: NEGATIVE for worrisome rashes, moles or lesions  ENT/MOUTH: as above  RESP:cough nonprodutive ,no sob  CV: NEGATIVE for chest pain, palpitations or peripheral edema  GI: NEGATIVE for nausea, abdominal pain, heartburn, or change in bowel habits    OBJECTIVE:     /88  Pulse 85  Temp 98.3  F (36.8  C) (Oral)  Ht 5' 1.75\" (1.568 m)  Wt 142 lb (64.4 kg)  LMP 10/28/2014 (Approximate)  SpO2 99%  Breastfeeding? No  BMI 26.18 kg/m2  Body mass index is 26.18 kg/(m^2).  GENERAL: healthy, alert and no distress  EYES: Eyes grossly normal to inspection, PERRL and conjunctivae and sclerae normal  HENT: ear canals and TM's normal, nose congestion  and mouth without ulcers or lesions  NECK: no adenopathy, no asymmetry, masses, or scars and thyroid normal to palpation  RESP: lungs clear to auscultation - no rales, " rhonchi or wheezes  CV: regular rate and rhythm, normal S1 S2, no S3 or S4, no murmur, click or rub, no peripheral edema and peripheral pulses strong  ABDOMEN: soft, nontender, no hepatosplenomegaly, no masses and bowel sounds normal  MS: no gross musculoskeletal defects noted, no edema    Diagnostic Test Results:  none     ASSESSMENT/PLAN:     (J06.9) Upper respiratory tract infection, unspecified type  (primary encounter diagnosis)  Comment:   Plan: fluticasone (FLONASE ALLERGY RELIEF) 50 MCG/ACT        spray, benzonatate (TESSALON) 200 MG capsule            (R05) Cough  Comment: Prolonged  Plan: azithromycin (ZITHROMAX) 250 MG tablet        SEE EPIC care orders  The potential side effects of this medication have been discussed with the patient.  Call if any significant problems with these are experienced.  Follow up 1 week if not better/sooner if worse for CXR     Shelly Ruiz MD  [facility]

## 2017-09-15 NOTE — PATIENT INSTRUCTIONS
[facility]    If you have any questions regarding to your visit please contact your care team:       Team Red:   Clinic Hours Telephone Number   Dr. Elinor Sarabia, NP   7am-7pm  Monday - Thursday   7am-5pm  Fridays  [phone]  (Appointment scheduling available 24/7)    Questions about your visit?   Team Line  [phone]   Urgent Care - [place] - 11am-9pm Monday-Friday Saturday-Sunday- 9am-5pm   [place] - 5pm-9pm Monday-Friday Saturday-Sunday- 9am-5pm  [phone] - [place]   [phone] - [place]       What options do I have for visits at the clinic other than the traditional office visit?  To expand how we care for you, many of our providers are utilizing electronic visits (e-visits) and telephone visits, when medically appropriate, for interactions with their patients rather than a visit in the clinic.   We also offer nurse visits for many medical concerns. Just like any other service, we will bill your insurance company for this type of visit based on time spent on the phone with your provider. Not all insurance companies cover these visits. Please check with your medical insurance if this type of visit is covered. You will be responsible for any charges that are not paid by your insurance.      E-visits via [employer]:  generally incur a $35.00 fee.  Telephone visits:  Time spent on the phone: *charged based on time that is spent on the phone in increments of 10 minutes. Estimated cost:   5-10 mins $30.00   11-20 mins. $59.00   21-30 mins. $85.00     Use [employer] (secure email communication and access to your chart) to send your primary care provider a message or make an appointment. Ask someone on your Team how to sign up for [employer].  For a Price Quote for your services, please call our Consumer Price Line at [phone].      As always, Thank you for trusting us with your health care needs!  Adolph OLIVEIRA  [place]

## 2017-09-15 NOTE — NURSING NOTE
"Chief Complaint   Patient presents with     URI       Initial /88  Pulse 85  Temp 98.3  F (36.8  C) (Oral)  Ht 5' 1.75\" (1.568 m)  Wt 142 lb (64.4 kg)  LMP 10/28/2014 (Approximate)  SpO2 99%  Breastfeeding? No  BMI 26.18 kg/m2 Estimated body mass index is 26.18 kg/(m^2) as calculated from the following:    Height as of this encounter: 5' 1.75\" (1.568 m).    Weight as of this encounter: 142 lb (64.4 kg).  Medication Reconciliation: complete   Ghada VAZQUEZ MA      "

## 2017-09-15 NOTE — MR AVS SNAPSHOT
After Visit Summary   9/15/2017    Trinity Ramírez    MRN: [id number]           Patient Information     Date Of Birth          1975        Visit Information        Provider Department      9/15/2017 11:30 AM Shelly Ruiz MD; [employer] [facility]        Today's Diagnoses     Upper respiratory tract infection, unspecified type    -  1    Cough          Care Instructions    [facility]    If you have any questions regarding to your visit please contact your care team:       Team Red:   Clinic Hours Telephone Number   Dr. Elinor Sarabia, NP   7am-7pm  Monday - Thursday   7am-5pm  Fridays  [phone]  (Appointment scheduling available 24/7)    Questions about your visit?   Team Line  [phone]   Urgent [facility] - [place] [facility] - 11am-9pm Monday-Friday Saturday-Sunday- 9am-5pm   [place] - 5pm-9pm Monday-Friday Saturday-Sunday- 9am-5pm  [phone] - [facility]  [phone] - [place]       What options do I have for visits at the clinic other than the traditional office visit?  To expand how we care for you, many of our providers are utilizing electronic visits (e-visits) and telephone visits, when medically appropriate, for interactions with their patients rather than a visit in the clinic.   We also offer nurse visits for many medical concerns. Just like any other service, we will bill your insurance company for this type of visit based on time spent on the phone with your provider. Not all insurance companies cover these visits. Please check with your medical insurance if this type of visit is covered. You will be responsible for any charges that are not paid by your insurance.      E-visits via [employer]:  generally incur a $35.00 fee.  Telephone visits:  Time spent on the phone: *charged based on time that is spent on the phone in increments of 10 minutes. Estimated cost:  "  5-10 mins $30.00   11-20 mins. $59.00   21-30 mins. $85.00     Use [employer] (secure email communication and access to your chart) to send your primary care provider a message or make an appointment. Ask someone on your Team how to sign up for [employer].  For a Price Quote for your services, please call our [employer] Line at [phone].      As always, Thank you for trusting us with your health care needs!  Adolph Ames            Follow-ups after your visit        Who to contact     If you have questions or need follow up information about today's clinic visit or your schedule please contact [facility] directly at [phone].  Normal or non-critical lab and imaging results will be communicated to you by [employer], letter or phone within 4 business days after the clinic has received the results. If you do not hear from us within 7 days, please contact the clinic through [employer] or phone. If you have a critical or abnormal lab result, we will notify you by phone as soon as possible.  Submit refill requests through [employer] or call your pharmacy and they will forward the refill request to us. Please allow 3 business days for your refill to be completed.          Additional Information About Your Visit        [employer] Information     [employer] lets you send messages to your doctor, view your test results, renew your prescriptions, schedule appointments and more. To sign up, go to [email or web address] . Click on \"Log in\" on the left side of the screen, which will take you to the Welcome page. Then click on \"Sign up Now\" on the right side of the page.     You will be asked to enter the access code listed below, as well as some personal information. Please follow the directions to create your username and password.     Your access code is: [email or web address]  Expires: 2017 12:13 PM     Your access code will  in 90 days. If you need help or a new code, please call your [facility] or " "[phone].        Care EveryWhere ID     This is your Care EveryWhere ID. This could be used by other organizations to access your [facility] medical records  [id number]        Your Vitals Were     Pulse Temperature Height Last Period Pulse Oximetry Breastfeeding?    85 98.3  F (36.8  C) (Oral) 5' 1.75\" (1.568 m) 10/28/2014 (Approximate) 99% No    BMI (Body Mass Index)                   26.18 kg/m2            Blood Pressure from Last 3 Encounters:   09/15/17 116/88   07/06/17 107/74   06/27/17 112/73    Weight from Last 3 Encounters:   09/15/17 142 lb (64.4 kg)   06/27/17 142 lb (64.4 kg)   06/26/17 141 lb 6.4 oz (64.1 kg)              Today, you had the following     No orders found for display         Today's Medication Changes          These changes are accurate as of: 9/15/17 12:13 PM.  If you have any questions, ask your nurse or doctor.               Start taking these medicines.        Dose/Directions    azithromycin 250 MG tablet   Commonly known as:  ZITHROMAX   Used for:  Cough   Started by:  Shelly Ruiz MD        Two tablets first day, then one tablet daily for four days.   Quantity:  6 tablet   Refills:  0       benzonatate 200 MG capsule   Commonly known as:  TESSALON   Used for:  Upper respiratory tract infection, unspecified type   Started by:  Shelly Ruiz MD        Dose:  200 mg   Take 1 capsule (200 mg) by mouth 3 times daily as needed for cough   Quantity:  21 capsule   Refills:  0       fluticasone 50 MCG/ACT spray   Commonly known as:  FLONASE ALLERGY RELIEF   Used for:  Upper respiratory tract infection, unspecified type   Started by:  Shelly Ruiz MD        Dose:  2 spray   Spray 2 sprays into both nostrils daily   Quantity:  1 Bottle   Refills:  1            Where to get your medicines      These medications were sent to [facility] - [address]  [address], [place] [address]     Phone:  [phone]     azithromycin 250 MG " tablet    benzonatate 200 MG capsule    fluticasone 50 MCG/ACT spray                Primary Care Provider Office Phone # Fax #    STEVIE Sanchez [facility] [phone]       [facility] [address] [facility] [address]  [facility] [address]        Equal Access to Services     PHYLLIS TERRAZAS : Hadii aad ku hadasho Soomaali, waaxda luqadaha, qaybta kaalmada adeegyada, waxay idiin hayaan adeursula khsidmimi lanayeli hoover. So [facility] [phone].    ATENCIÓN: Si habla español, tiene a faria disposición servicios gratuitos de asistencia lingüística. Dilcia al [phone].    We comply with applicable federal civil rights laws and [place] laws. We do not discriminate on the basis of race, color, national origin, age, disability sex, sexual orientation or gender identity.            Thank you!     [facility] you [facility]  for your care. Our goal is always to provide you with excellent care. Hearing back from our patients is one way we can continue to improve our services. Please take a few minutes to complete the written survey that you may receive in the mail after your visit with us. Thank you!             Your Updated Medication List - Protect others around you: Learn how to safely use, store and throw away your medicines at www.disposemymeds.org.          This list is accurate as of: 9/15/17 12:13 PM.  Always use your most recent med list.                   Brand Name Dispense Instructions for use Diagnosis    azithromycin 250 MG tablet    ZITHROMAX    6 tablet    Two tablets first day, then one tablet daily for four days.    Cough       benzonatate 200 MG capsule    TESSALON    21 capsule    Take 1 capsule (200 mg) by mouth 3 times daily as needed for cough    Upper respiratory tract infection, unspecified type       fluticasone 50 MCG/ACT spray    FLONASE ALLERGY RELIEF    1 Bottle    Spray 2 sprays into both nostrils daily    Upper respiratory tract infection, unspecified type